# Patient Record
Sex: FEMALE | Race: WHITE
[De-identification: names, ages, dates, MRNs, and addresses within clinical notes are randomized per-mention and may not be internally consistent; named-entity substitution may affect disease eponyms.]

---

## 2018-08-18 ENCOUNTER — HOSPITAL ENCOUNTER (EMERGENCY)
Dept: HOSPITAL 31 - C.ER | Age: 73
Discharge: HOME | End: 2018-08-18
Payer: MEDICARE

## 2018-08-18 VITALS — TEMPERATURE: 98.6 F | HEART RATE: 62 BPM | DIASTOLIC BLOOD PRESSURE: 87 MMHG | SYSTOLIC BLOOD PRESSURE: 134 MMHG

## 2018-08-18 VITALS — RESPIRATION RATE: 18 BRPM

## 2018-08-18 VITALS — OXYGEN SATURATION: 99 %

## 2018-08-18 DIAGNOSIS — N39.0: Primary | ICD-10-CM

## 2018-08-18 LAB
ALBUMIN SERPL-MCNC: 3.9 G/DL (ref 3.5–5)
ALBUMIN/GLOB SERPL: 1.3 {RATIO} (ref 1–2.1)
ALT SERPL-CCNC: 24 U/L (ref 9–52)
AST SERPL-CCNC: 25 U/L (ref 14–36)
BACTERIA #/AREA URNS HPF: (no result) /[HPF]
BASOPHILS # BLD AUTO: 0.1 K/UL (ref 0–0.2)
BASOPHILS NFR BLD: 0.9 % (ref 0–2)
BILIRUB UR-MCNC: NEGATIVE MG/DL
BUN SERPL-MCNC: 24 MG/DL (ref 7–17)
CALCIUM SERPL-MCNC: 9.5 MG/DL (ref 8.6–10.4)
EOSINOPHIL # BLD AUTO: 0.1 K/UL (ref 0–0.7)
EOSINOPHIL NFR BLD: 1.4 % (ref 0–4)
ERYTHROCYTE [DISTWIDTH] IN BLOOD BY AUTOMATED COUNT: 14.8 % (ref 11.5–14.5)
GFR NON-AFRICAN AMERICAN: 55
GLUCOSE UR STRIP-MCNC: (no result) MG/DL
HGB BLD-MCNC: 12 G/DL (ref 11–16)
LEUKOCYTE ESTERASE UR-ACNC: (no result) LEU/UL
LYMPHOCYTES # BLD AUTO: 1.1 K/UL (ref 1–4.3)
LYMPHOCYTES NFR BLD AUTO: 15.4 % (ref 20–40)
MCH RBC QN AUTO: 29.2 PG (ref 27–31)
MCHC RBC AUTO-ENTMCNC: 33.5 G/DL (ref 33–37)
MCV RBC AUTO: 87.2 FL (ref 81–99)
MONOCYTES # BLD: 0.5 K/UL (ref 0–0.8)
MONOCYTES NFR BLD: 7.2 % (ref 0–10)
NEUTROPHILS # BLD: 5.4 K/UL (ref 1.8–7)
NEUTROPHILS NFR BLD AUTO: 75.1 % (ref 50–75)
NRBC BLD AUTO-RTO: 0 % (ref 0–2)
PH UR STRIP: 5 [PH] (ref 5–8)
PLATELET # BLD: 244 K/UL (ref 130–400)
PMV BLD AUTO: 8.3 FL (ref 7.2–11.7)
PROT UR STRIP-MCNC: (no result) MG/DL
RBC # BLD AUTO: 4.12 MIL/UL (ref 3.8–5.2)
RBC # UR STRIP: (no result) /UL
SP GR UR STRIP: 1.01 (ref 1–1.03)
SQUAMOUS EPITHIAL: 7 /HPF (ref 0–5)
UROBILINOGEN UR-MCNC: NORMAL MG/DL (ref 0.2–1)
WBC # BLD AUTO: 7.2 K/UL (ref 4.8–10.8)

## 2018-08-18 NOTE — C.PDOC
History Of Present Illness


73 year old female, with PMHx of HTN, diabetes, hysterectomy secondary to 

endometriosis, presents to ED for evaluation of dysuria associated with urinary 

frequency since yesterday. She reports occasional back pain, alternating 

between the left and right side. She reports taking Ibuprofen at 9:30 this 

morning with improvement of pain. Otherwise, denies vaginal bleeding, vaginal 

discharge, abdominal pain, n/v/d, fever, or chills. 





Time Seen by Provider: 08/18/18 10:47


Chief Complaint (Nursing): Female Genitourinary


History Per: Patient


History/Exam Limitations: no limitations


Onset/Duration Of Symptoms: Days


Current Symptoms Are (Timing): Still Present


Quality Of Discomfort: "Pain"


Associated Symptoms: Back Pain, Urinary Symptoms.  denies: Loss Of Appetite, 

Constipation


Alleviating Factors: OTC Meds


Recent travel outside of the [place]: No


Additional History Per: Patient





Past Medical History


Reviewed: Historical Data, Nursing Documentation, Vital Signs


Vital Signs: 


 Last Vital Signs











Temp  98.6 F   08/18/18 12:45


 


Pulse  62   08/18/18 12:45


 


Resp  18   08/18/18 12:45


 


BP  134/87   08/18/18 12:45


 


Pulse Ox  99   08/18/18 13:06














- Medical History


PMH: Diabetes, HTN


Family History: States: Unknown Family Hx





- Social History


Hx Alcohol Use: No


Hx Substance Use: No





Review Of Systems


Except As Marked, All Systems Reviewed And Found Negative.


Constitutional: Negative for: Fever, Chills


Gastrointestinal: Negative for: Nausea, Vomiting, Abdominal Pain, Diarrhea


Genitourinary: Positive for: Dysuria, Frequency.  Negative for: Incontinence, 

Hematuria, Vaginal Discharge, Vaginal Bleeding, Pelvic Pain


Musculoskeletal: Positive for: Back Pain


Neurological: Negative for: Weakness, Numbness





Physical Exam





- Physical Exam


Appears: Non-toxic, No Acute Distress


Skin: Normal Color, Warm, Dry


Head: Atraumatic, Normacephalic


Eye(s): bilateral: Normal Inspection, EOMI


Nose: Normal


Oral Mucosa: Moist


Neck: Normal ROM, Supple


Chest: Symmetrical


Cardiovascular: Rhythm Regular


Respiratory: Normal Breath Sounds, No Rales, No Rhonchi, No Wheezing


Gastrointestinal/Abdominal: Soft, No Tenderness


Back: Normal Inspection, No CVA Tenderness, No Vertebral Tenderness, No 

Paraspinal Tenderness


Extremity: Normal ROM


Neurological/Psych: Oriented x3, Normal Speech





ED Course And Treatment





- Laboratory Results


Result Diagrams: 


 08/18/18 11:44





 08/18/18 11:44


O2 Sat by Pulse Oximetry: 99 (RA)


Pulse Ox Interpretation: Normal





- CT Scan/US


  ** Abd & Pelvis CT


Other Rad Studies (CT/US): Read By Radiologist, Radiology Report Reviewed


CT/US Interpretation: FINDINGS:  LOWER THORAX:  Scattered atelectasis at the 

lung bases.  LIVER:  Unremarkable. No gross lesion or ductal dilatation.  

GALLBLADDER AND BILE DUCTS:  Contracted gallbladder.  PANCREAS:  Unremarkable. 

No mass. No ductal dilatation.  SPLEEN:  Unremarkable. No splenomegaly.  

ADRENALS:  Mild nodular thickening of the adrenal glands.  KIDNEYS AND URETERS:

  Unremarkable. No stone or hydronephrosis.  BLADDER:  Apparent thickening of 

urinary bladder wall associated mild pericystic induration which may represent 

an underlying cystitis.  Clinical correlation.  REPRODUCTIVE:  Unremarkable.  

APPENDIX:  Unremarkable.  BOWEL:  Fecal retention in the colon.  PERITONEUM:  

Unremarkable. No fluid collection. No free air.  LYMPH NODES:  Few shotty 

inguinal and para-aortic lymph nodes. Few shotty mesenteric lymph nodes.  

VASCULATURE:  Atherosclerotic calcification and plaque within the aorta.  BONES

:  Degenerative changes in the visualized osseous structures.  OTHER FINDINGS:  

Limited evaluation of the intra-abdominal organs without intravenous contrast.  

IMPRESSION:  Thickening of the urinary bladder wall with associated pericystic 

induration concerning for underlying cystitis.  Clinical correlation.  

Additional findings as above.


Progress Note: Blood work, Urinalysis, Abd & Pelvis CT ordered and reviewed. On 

re-eval, pt is resting comfortably, no acute disterss. Denies any back pain at 

this time. Afebrile. ToleratingPO. Instructed to follow up with PMD in 1-2 days 

and return precautions. Case discussed with Dr Marcus, agreed upon plan and 

discharge.





Disposition





- Disposition


Disposition: HOME/ ROUTINE


Disposition Time: 13:04


Condition: STABLE


Additional Instructions: 


Follow up with your doctor in 1-2 days. Return to ER if symptoms persist or 

worsen. 


Prescriptions: 


Sulfamethoxazole/Trimethoprim [Bactrim  mg-160 mg] 1 tab PO BID #14 tab


Instructions:  Urinary Tract Infection, Adult (DC)


Forms:  [facility] (English)





- Clinical Impression


Clinical Impression: 


 UTI (urinary tract infection)








- PA / NP / Resident Statement


MD/DO has reviewed & agrees with the documentation as recorded.





- Scribe Statement


The provider has reviewed the documentation as recorded by the Scribe





KP





All medical record entries made by the Scribe were at my direction and 

personally dictated by me. I have reviewed the chart and agree that the record 

accurately reflects my personal performance of the history, physical exam, 

medical decision making, and the department course for this patient. I have 

also personally directed, reviewed, and agree with the discharge instructions 

and disposition.

## 2018-08-18 NOTE — CT
Date of service: 



08/18/2018



PROCEDURE:  CT Abdomen and Pelvis with Oral contrast.



HISTORY:

Abdominal pain 



COMPARISON:

CT dated 09/09/2014



TECHNIQUE:

Contiguous axial images of the abdomen and pelvis. Oral contrast was 

administered. No IV contrast given. Coronal and Sagittal reformats 

generated. 



Radiation dose:



Total exam DLP = 385 mGy-cm.



This CT exam was performed using one or more of the following dose 

reduction techniques: Automated exposure control, adjustment of the 

mA and/or kV according to patient size, and/or use of iterative 

reconstruction technique.



FINDINGS:



LOWER THORAX:

Scattered atelectasis at the lung bases.



LIVER:

Unremarkable. No gross lesion or ductal dilatation.



GALLBLADDER AND BILE DUCTS:

Contracted gallbladder. 



PANCREAS:

Unremarkable. No mass. No ductal dilatation.



SPLEEN:

Unremarkable. No splenomegaly. 



ADRENALS:

Mild nodular thickening of the adrenal glands. 



KIDNEYS AND URETERS:

Unremarkable. No stone or hydronephrosis. 



BLADDER:

Apparent thickening of urinary bladder wall associated mild 

pericystic induration which may represent an underlying cystitis.  

Clinical correlation.



REPRODUCTIVE:

Unremarkable. 



APPENDIX:

Unremarkable.



BOWEL:

Fecal retention in the colon. 



PERITONEUM:

Unremarkable. No fluid collection. No free air.



LYMPH NODES:

Few shotty inguinal and para-aortic lymph nodes. Few shotty 

mesenteric lymph nodes. 



VASCULATURE:

Atherosclerotic calcification and plaque within the aorta. 



BONES:

Degenerative changes in the visualized osseous structures



OTHER FINDINGS:

Limited evaluation of the intra-abdominal organs without intravenous 

contrast.



IMPRESSION:

Thickening of the urinary bladder wall with associated pericystic 

induration concerning for underlying cystitis.  Clinical correlation.



Additional findings as above.